# Patient Record
Sex: FEMALE | Race: AMERICAN INDIAN OR ALASKA NATIVE
[De-identification: names, ages, dates, MRNs, and addresses within clinical notes are randomized per-mention and may not be internally consistent; named-entity substitution may affect disease eponyms.]

---

## 2018-02-12 ENCOUNTER — HOSPITAL ENCOUNTER (EMERGENCY)
Dept: HOSPITAL 5 - ED | Age: 23
LOS: 1 days | Discharge: LEFT BEFORE DISCHARGE | End: 2018-02-13
Payer: SELF-PAY

## 2018-02-12 DIAGNOSIS — Z53.21: ICD-10-CM

## 2018-02-12 DIAGNOSIS — R06.02: Primary | ICD-10-CM

## 2018-02-12 DIAGNOSIS — R10.9: ICD-10-CM

## 2018-02-12 PROCEDURE — 83690 ASSAY OF LIPASE: CPT

## 2018-02-12 PROCEDURE — 36415 COLL VENOUS BLD VENIPUNCTURE: CPT

## 2018-02-12 PROCEDURE — 80053 COMPREHEN METABOLIC PANEL: CPT

## 2018-02-12 PROCEDURE — 93005 ELECTROCARDIOGRAM TRACING: CPT

## 2018-02-12 PROCEDURE — 93010 ELECTROCARDIOGRAM REPORT: CPT

## 2018-02-12 PROCEDURE — 81025 URINE PREGNANCY TEST: CPT

## 2018-02-12 PROCEDURE — 85025 COMPLETE CBC W/AUTO DIFF WBC: CPT

## 2018-02-12 PROCEDURE — 81001 URINALYSIS AUTO W/SCOPE: CPT

## 2018-02-13 VITALS — SYSTOLIC BLOOD PRESSURE: 118 MMHG | DIASTOLIC BLOOD PRESSURE: 65 MMHG

## 2018-02-13 LAB
ALBUMIN SERPL-MCNC: 4.2 G/DL (ref 3.9–5)
ALT SERPL-CCNC: 9 UNITS/L (ref 7–56)
BASOPHILS # (AUTO): 0 K/MM3 (ref 0–0.1)
BASOPHILS NFR BLD AUTO: 0.8 % (ref 0–1.8)
BILIRUB UR QL STRIP: (no result)
BLOOD UR QL VISUAL: (no result)
BUN SERPL-MCNC: 9 MG/DL (ref 7–17)
BUN/CREAT SERPL: 18 %
CALCIUM SERPL-MCNC: 8.9 MG/DL (ref 8.4–10.2)
EOSINOPHIL # BLD AUTO: 0.1 K/MM3 (ref 0–0.4)
EOSINOPHIL NFR BLD AUTO: 1.1 % (ref 0–4.3)
HCT VFR BLD CALC: 40.8 % (ref 30.3–42.9)
HEMOLYSIS INDEX: 14
HGB BLD-MCNC: 13.9 GM/DL (ref 10.1–14.3)
LIPASE SERPL-CCNC: 26 UNITS/L (ref 13–60)
LYMPHOCYTES # BLD AUTO: 2.2 K/MM3 (ref 1.2–5.4)
LYMPHOCYTES NFR BLD AUTO: 40.8 % (ref 13.4–35)
MCH RBC QN AUTO: 31 PG (ref 28–32)
MCHC RBC AUTO-ENTMCNC: 34 % (ref 30–34)
MCV RBC AUTO: 92 FL (ref 79–97)
MONOCYTES # (AUTO): 0.4 K/MM3 (ref 0–0.8)
MONOCYTES % (AUTO): 7.2 % (ref 0–7.3)
NITRITE UR QL STRIP: (no result)
PH UR STRIP: 6 [PH] (ref 5–7)
PLATELET # BLD: 155 K/MM3 (ref 140–440)
PROT UR STRIP-MCNC: (no result) MG/DL
RBC # BLD AUTO: 4.45 M/MM3 (ref 3.65–5.03)
RBC #/AREA URNS HPF: 1 /HPF (ref 0–6)
UROBILINOGEN UR-MCNC: < 2 MG/DL (ref ?–2)
WBC #/AREA URNS HPF: 1 /HPF (ref 0–6)

## 2018-02-16 ENCOUNTER — HOSPITAL ENCOUNTER (OUTPATIENT)
Dept: HOSPITAL 5 - ED | Age: 23
Setting detail: OBSERVATION
LOS: 2 days | Discharge: HOME | End: 2018-02-18
Attending: OBSTETRICS & GYNECOLOGY | Admitting: OBSTETRICS & GYNECOLOGY
Payer: SELF-PAY

## 2018-02-16 DIAGNOSIS — R10.31: ICD-10-CM

## 2018-02-16 DIAGNOSIS — N83.519: Primary | ICD-10-CM

## 2018-02-16 LAB
ALBUMIN SERPL-MCNC: 4.8 G/DL (ref 3.9–5)
ALT SERPL-CCNC: 10 UNITS/L (ref 7–56)
BACTERIA #/AREA URNS HPF: (no result) /HPF
BASOPHILS # (AUTO): 0 K/MM3 (ref 0–0.1)
BASOPHILS NFR BLD AUTO: 0.5 % (ref 0–1.8)
BILIRUB UR QL STRIP: (no result)
BLOOD UR QL VISUAL: (no result)
BUN SERPL-MCNC: 7 MG/DL (ref 7–17)
BUN/CREAT SERPL: 14 %
CALCIUM SERPL-MCNC: 9.8 MG/DL (ref 8.4–10.2)
EOSINOPHIL # BLD AUTO: 0 K/MM3 (ref 0–0.4)
EOSINOPHIL NFR BLD AUTO: 0.4 % (ref 0–4.3)
HCT VFR BLD CALC: 45.1 % (ref 30.3–42.9)
HEMOLYSIS INDEX: 22
HGB BLD-MCNC: 14.9 GM/DL (ref 10.1–14.3)
LYMPHOCYTES # BLD AUTO: 2.4 K/MM3 (ref 1.2–5.4)
LYMPHOCYTES NFR BLD AUTO: 36.2 % (ref 13.4–35)
MCH RBC QN AUTO: 31 PG (ref 28–32)
MCHC RBC AUTO-ENTMCNC: 33 % (ref 30–34)
MCV RBC AUTO: 92 FL (ref 79–97)
MONOCYTES # (AUTO): 0.3 K/MM3 (ref 0–0.8)
MONOCYTES % (AUTO): 5.1 % (ref 0–7.3)
NITRITE UR QL STRIP: (no result)
PH UR STRIP: 6 [PH] (ref 5–7)
PLATELET # BLD: 181 K/MM3 (ref 140–440)
PROT UR STRIP-MCNC: (no result) MG/DL
RBC # BLD AUTO: 4.89 M/MM3 (ref 3.65–5.03)
RBC #/AREA URNS HPF: 1 /HPF (ref 0–6)
UROBILINOGEN UR-MCNC: < 2 MG/DL (ref ?–2)
WBC #/AREA URNS HPF: 1 /HPF (ref 0–6)

## 2018-02-16 PROCEDURE — 99285 EMERGENCY DEPT VISIT HI MDM: CPT

## 2018-02-16 PROCEDURE — 85025 COMPLETE CBC W/AUTO DIFF WBC: CPT

## 2018-02-16 PROCEDURE — 93975 VASCULAR STUDY: CPT

## 2018-02-16 PROCEDURE — 80053 COMPREHEN METABOLIC PANEL: CPT

## 2018-02-16 PROCEDURE — G0378 HOSPITAL OBSERVATION PER HR: HCPCS

## 2018-02-16 PROCEDURE — 96376 TX/PRO/DX INJ SAME DRUG ADON: CPT

## 2018-02-16 PROCEDURE — 74019 RADEX ABDOMEN 2 VIEWS: CPT

## 2018-02-16 PROCEDURE — 84703 CHORIONIC GONADOTROPIN ASSAY: CPT

## 2018-02-16 PROCEDURE — 96374 THER/PROPH/DIAG INJ IV PUSH: CPT

## 2018-02-16 PROCEDURE — 76830 TRANSVAGINAL US NON-OB: CPT

## 2018-02-16 PROCEDURE — 96375 TX/PRO/DX INJ NEW DRUG ADDON: CPT

## 2018-02-16 PROCEDURE — 81001 URINALYSIS AUTO W/SCOPE: CPT

## 2018-02-16 PROCEDURE — 36415 COLL VENOUS BLD VENIPUNCTURE: CPT

## 2018-02-17 RX ADMIN — SODIUM CHLORIDE, SODIUM LACTATE, POTASSIUM CHLORIDE, AND CALCIUM CHLORIDE SCH MLS/HR: .6; .31; .03; .02 INJECTION, SOLUTION INTRAVENOUS at 06:10

## 2018-02-17 RX ADMIN — KETOROLAC TROMETHAMINE PRN MG: 30 INJECTION, SOLUTION INTRAMUSCULAR at 12:22

## 2018-02-17 RX ADMIN — KETOROLAC TROMETHAMINE PRN MG: 30 INJECTION, SOLUTION INTRAMUSCULAR at 06:10

## 2018-02-17 RX ADMIN — Medication SCH: at 09:45

## 2018-02-17 RX ADMIN — HYDROCODONE BITARTRATE AND ACETAMINOPHEN PRN EACH: 5; 325 TABLET ORAL at 14:47

## 2018-02-17 RX ADMIN — KETOROLAC TROMETHAMINE PRN MG: 30 INJECTION, SOLUTION INTRAMUSCULAR at 18:33

## 2018-02-17 NOTE — ULTRASOUND REPORT
FINAL REPORT



EXAM:  US PELVIS DUPLEX DOPPLER COMP



HISTORY:  pelvic pain 



TECHNIQUE:  Transabdominal imaging was obtained of the pelvis

with Doppler interrogation of the adnexa.



FINDINGS:  

The uterus is anteverted measuring 5.9 cm x 3.2 cm x 4.8 cm. The

myometrium is homogeneous. The endometrial thickness is 6.7

millimeters and appears normal. Free fluid is not seen.



The left ovary is normal in size contour and echotexture

measuring 2.9 cm x 1.7 cm x 2.2 cm. The Doppler waveforms appear

normal.



The right ovary appears enlarged measuring 2.3 cm x 2.3 cm x 4.3

cm. There are high Doppler waveforms in the right ovary. Partial

torsion is suspected.



IMPRESSION:  

Enlarged right ovary with elevated waveforms suspicious for

partial ovarian torsion.



Normal-appearing left ovary. No evidence of free fluid.

## 2018-02-17 NOTE — SHORT STAY SUMMARY
Short Stay Documentation


Date of service: 02/17/18


Narrative H&P: 





Pt is a 23-year-old  female G0 LMP 1/3/18 presents to the 

emergency department with complaint of some pain in the right side of the lower 

abdomen and/or pelvis with some radiation down her leg that has been going on 

for about 5 days.  She denies any nausea, vomiting, dysuria, vaginal bleeding 

or discharge, fever, constipation, diarrhea.  No known aggravating or 

alleviating factors.  She has taken some Tylenol for her symptoms without any 

relief.  She denies any past medical history other than sickle cell trait.  She 

does not have a primary care physician at this time as she says she recently 

moved to the area.  No sick contacts at home. Pelvic u/s showed an enlarged 

right ovary with elevated waveforms suspicious for partial ovarian torsion. She 

will therefore be admitted for Observation and conservative management.








- History


Principal diagnosis: Suspect ovarian torsion


H&P: obtained from office


Past Medical History: No medical history


Past Surgical History: No surgical history


Social history: no significant social history, single





- Allergies and Medications


Current Medications: 


 Allergies





No Known Allergies Allergy (Verified 02/13/18 00:19)


 





 Home Medications











 Medication  Instructions  Recorded  Confirmed  Last Taken  Type


 


No Known Home Medications [No  02/16/18 02/16/18 Unknown History





Reported Home Medications]     














- Physical exam


General appearance: mild distress


Integumentary: no rash


HEENT: Atraumatic


Lungs: Clear to auscultation


Breasts: deferred


Heart: Regular rate


Gastrointestinal: normal


Female Genitourinary: deferred


Extremities: no ischemia


Neurological: Normal speech





- Hospital course


Hospital course: 





Pt was admitted for Observation and pain management, and her symptoms improved.

  She is tolerating a reg diet without nausea or vomiting, ambulating and 

voiding without difficulty.  She will therefore be discharged to home with PO 

[place] for pain control and follow up with me in my office in 2-3 days.





- Disposition


Condition at discharge: Good


Disposition: DC-01 TO HOME OR SELFCARE





- Discharge Diagnoses


(1) Abdominal pain


Status: Resolved   


Qualifiers: 


   Abdominal location: right lower quadrant   Qualified Code(s): R10.31 - Right 

lower quadrant pain   





(2) Ovarian torsion


Status: Resolved   





Short Stay Discharge Plan


Activity: no restrictions


Diet: regular


Follow up with: 


PRIMARY CARE,MD [Primary Care Provider] - 3-5 Days


RAQUEL MALDONADO MD [Staff Physician] - 3 Days


Prescriptions: 


HYDROcodone/APAP 5-325 [Norco 5-325 mg TAB] 1 each PO Q6H PRN #30 tablet


 PRN Reason: Pain, Moderate (4-6)

## 2018-02-17 NOTE — ULTRASOUND REPORT
FINAL REPORT



EXAM:  US TRANSVAGINAL



HISTORY:  pelvic pain 



TECHNIQUE:  Transvaginal imaging was obtained of the pelvis with

Doppler interrogation of the adnexa.



FINDINGS:  

The uterus is anteverted measuring 5.9 cm x 3.2 cm x 4.8 cm. The

myometrium is homogeneous. The endometrium is 6.7 millimeters and

appears normal. Free fluid is not seen.



The left ovary is normal size contour echotexture and blood flow

containing benign follicles. It measures 2.9 cm x 1.7 cm x 2.2

cm.



The right ovary is mildly enlarged measuring 2.3 cm x 2.3 cm x

4.3 cm. There are benign follicles in the right ovary. There are

elevated waveforms in the right ovary. Partial torsion is

suspected.



IMPRESSION:  

Enlarged right ovary with elevated waveforms suspicious for

partial ovarian torsion.

## 2018-02-17 NOTE — XRAY REPORT
FINAL REPORT



EXAM:  XR ABDOMEN 2V



HISTORY:  Abd pain 



TECHNIQUE:  Two views of the abdomen and pelvis were submitted.



FINDINGS:  

The bowel gas pattern is normal. There is no evidence of mass

effect or suspicious calcifications. The lung bases are clear.

The skeletal structures appear normal.



IMPRESSION:  

Normal exam

## 2018-02-17 NOTE — EMERGENCY DEPARTMENT REPORT
HPI





- General


Chief Complaint: Abdominal Pain


Time Seen by Provider: 02/17/18 00:57





- HPI


HPI: 


23-year-old  female presents to the emergency department with 

complaint of some pain in the right side of the lower abdomen and/or pelvis 

with some radiation down her leg that has been going on for about 5 days.  She 

denies any nausea, vomiting, dysuria, vaginal bleeding or discharge, fever, 

constipation, diarrhea.  No known aggravating or alleviating factors.  She has 

taken some Tylenol for her symptoms without any relief.  She denies any past 

medical history other than sickle cell trait.  She does not have a primary care 

physician at this time as she says she recently moved to the area.  No sick 

contacts at home.








ED Past Medical Hx





- Past Medical History


Hx Sickle Cell Disease: No (sickle cell trait)


Additional medical history: sickle cell trait. Scoliosis





- Surgical History


Past Surgical History?: No





- Social History


Smoking Status: Never Smoker


Substance Use Type: None





- Medications


Home Medications: 


 Home Medications











 Medication  Instructions  Recorded  Confirmed  Last Taken  Type


 


No Known Home Medications [No  02/16/18 02/16/18 Unknown History





Reported Home Medications]     














ED Review of Systems


ROS: 


Stated complaint: ABD PAIN


Other details as noted in HPI





Comment: All other systems reviewed and negative


Constitutional: denies: chills, fever


Eyes: denies: eye pain, eye discharge, vision change


ENT: denies: ear pain, throat pain


Respiratory: denies: cough, shortness of breath, wheezing


Cardiovascular: denies: chest pain, palpitations


Gastrointestinal: abdominal pain.  denies: vomiting


Genitourinary: denies: urgency, dysuria, discharge


Musculoskeletal: denies: joint swelling, arthralgia


Skin: denies: rash, lesions


Neurological: denies: headache, weakness, paresthesias





Physical Exam





- Physical Exam


Vital Signs: 


 Vital Signs











  02/16/18





  21:05


 


Temperature 98.6 F


 


Pulse Rate 88


 


Respiratory 16





Rate 


 


Blood Pressure 110/50


 


O2 Sat by Pulse 99





Oximetry 











Physical Exam: 





GENERAL: The patient is well-developed well-nourished.


HENT: Normocephalic.  Atraumatic.    Patient has moist mucous membranes.


EYES: Extraocular motions are intact.  Pupils equal reactive to light 

bilaterally.


NECK: Supple.  Trachea is midline.


CHEST/LUNGS: Clear to auscultation.  There is no respiratory distress noted.


HEART/CARDIOVASCULAR: Regular.  There is no tachycardia.  There is no murmur.


ABDOMEN: Abdomen is soft.  Unable to reproduce lower abdominal and/or pelvic 

pain to palpation.  Patient has normal bowel sounds.  There is no abdominal 

distention.


SKIN: Skin is warm and dry.


NEURO: The patient is awake, alert, and oriented.  The patient is cooperative.  

The patient has no focal neurologic deficits.  The patient has normal speech.


MUSCULOSKELETAL: There is no tenderness or deformity.  There is no limitation 

range of motion.  There is no evidence of acute injury.





ED Course


 Vital Signs











  02/16/18





  21:05


 


Temperature 98.6 F


 


Pulse Rate 88


 


Respiratory 16





Rate 


 


Blood Pressure 110/50


 


O2 Sat by Pulse 99





Oximetry 














ED Medical Decision Making





- Lab Data


Result diagrams: 


 02/16/18 21:24





 02/16/18 21:24





- Radiology Data


Radiology results: report reviewed, image reviewed


interpreted by me: 





Abdominal x-ray shows nonspecific nonobstructive bowel gas





EXAM: US OB lt; = 14 WEEKS FETUS, US OB TRANSVAGINAL. 





HISTORY: Vaginal bleeding, pregnancy. 





TECHNIQUE: Directed transabdominal and transvaginal ultrasound 


examination of the pelvis was performed. No prior exams from this 


pregnancy are available for comparison. 





FINDINGS: 


The patient reports her last menstrual period 12/14/2017, 


corresponding to current gestational age of 9 weeks, 2 days. 





The uterus is anteverted, and measures 7.7 x 7.1 x 11.9 cm. There 


is a single intrauterine gestation, with visualization of the 


gestational sac, yolk sac, and fetal pole. The crown-rump length 


measures 2.7 cm, corresponding to gestational age of 9 weeks, 3 


days (estimated due date 09/19/2018). Fetal cardiac activity is 


identified, with a heart rate of 171 beats per minute. There is a 


1.5 x 0.5 cm amorphous region of hypoechogenicity adjacent to the 


anterior aspect of the gestational sac, probably small 


subchorionic hemorrhage. 





The right ovary measures 2.5 x 2.4 x 2.9 cm, and contains an 


irregularly marginated hypoechoic lesion measuring 2.0 x 1.2 cm, 


probably hemorrhagic cyst or corpus luteum. The left ovary is not 


visualized on the transabdominal or transvaginal images. There is 


appropriate color doppler flow within the right ovary. No other 


adnexal mass is seen. 





There is no significant pelvic free fluid. 





IMPRESSION: 


1. Single live intrauterine gestation with gestational age of 9 


weeks, 3 days, as estimated by crown-rump length (RUBI 


09/19/2018). 





2. Small focus of probable subchorionic hemorrhage anteriorly, 


and follow-up routine imaging is recommended. 





3. Nonvisualization of the left ovary. 2.0 x 1.2 cm hemorrhagic 


cyst or corpus luteum in the right ovary. 











Transcribed By: MIGUEL 


Dictated By: HOLLI JUNG MD 


Electronically Authenticated By: HOLLI JUNG MD 


Signed Date/Time: 02/16/18 9974 








- Medical Decision Making


Patient has 5-6 day history of right-sided pelvic pain.  Labs have been mostly 

unremarkable.  Abdominal x-ray does not show any acute process.  The 

transvaginal/pelvic Doppler ultrasound shows concern for a partial right-sided 

ovarian torsion.  The OB/GYN on-call, Dr. Ferris, was contacted and it was 

decided that the patient would be admitted for observation and he has 

graciously accepted the patient to his service.








- Differential Diagnosis


ovarian torsion, ovarian cyst, UTI, pregnancy, fibroid


Critical Care Time: No


Critical care attestation.: 


If time is entered above; I have spent that time in minutes in the direct care 

of this critically ill patient, excluding procedure time.








ED Disposition


Clinical Impression: 


 Ovarian torsion





Abdominal pain


Qualifiers:


 Abdominal location: right lower quadrant Qualified Code(s): R10.31 - Right 

lower quadrant pain





Disposition: DC-09 OP ADMIT IP TO THIS HOSP


Is pt being admited?: Yes


Condition: Stable


Instructions:  Abdominal Pain (ED)


Referrals: 


PRIMARY CARE,MD [Primary Care Provider] - 3-5 Days


Time of Disposition: 02:38

## 2018-02-18 VITALS — SYSTOLIC BLOOD PRESSURE: 96 MMHG | DIASTOLIC BLOOD PRESSURE: 61 MMHG

## 2018-02-18 RX ADMIN — SODIUM CHLORIDE, SODIUM LACTATE, POTASSIUM CHLORIDE, AND CALCIUM CHLORIDE SCH MLS/HR: .6; .31; .03; .02 INJECTION, SOLUTION INTRAVENOUS at 17:50

## 2018-02-18 RX ADMIN — Medication SCH: at 10:00

## 2018-02-18 RX ADMIN — HYDROCODONE BITARTRATE AND ACETAMINOPHEN PRN EACH: 5; 325 TABLET ORAL at 12:00

## 2018-02-18 RX ADMIN — KETOROLAC TROMETHAMINE PRN MG: 30 INJECTION, SOLUTION INTRAMUSCULAR at 00:57

## 2018-02-18 RX ADMIN — KETOROLAC TROMETHAMINE PRN MG: 30 INJECTION, SOLUTION INTRAMUSCULAR at 08:50

## 2018-02-21 ENCOUNTER — HOSPITAL ENCOUNTER (EMERGENCY)
Dept: HOSPITAL 5 - ED | Age: 23
Discharge: HOME | End: 2018-02-21
Payer: SELF-PAY

## 2018-02-21 VITALS — SYSTOLIC BLOOD PRESSURE: 99 MMHG | DIASTOLIC BLOOD PRESSURE: 56 MMHG

## 2018-02-21 DIAGNOSIS — N39.0: Primary | ICD-10-CM

## 2018-02-21 DIAGNOSIS — Z87.891: ICD-10-CM

## 2018-02-21 LAB
ALBUMIN SERPL-MCNC: 4.6 G/DL (ref 3.9–5)
ALT SERPL-CCNC: 9 UNITS/L (ref 7–56)
BASOPHILS # (AUTO): 0 K/MM3 (ref 0–0.1)
BASOPHILS NFR BLD AUTO: 0.5 % (ref 0–1.8)
BILIRUB UR QL STRIP: (no result)
BLOOD UR QL VISUAL: (no result)
BUN SERPL-MCNC: 9 MG/DL (ref 7–17)
BUN/CREAT SERPL: 15 %
CALCIUM SERPL-MCNC: 9.4 MG/DL (ref 8.4–10.2)
EOSINOPHIL # BLD AUTO: 0 K/MM3 (ref 0–0.4)
EOSINOPHIL NFR BLD AUTO: 0.7 % (ref 0–4.3)
HCT VFR BLD CALC: 41.4 % (ref 30.3–42.9)
HEMOLYSIS INDEX: 12
HGB BLD-MCNC: 13.7 GM/DL (ref 10.1–14.3)
LYMPHOCYTES # BLD AUTO: 1.4 K/MM3 (ref 1.2–5.4)
LYMPHOCYTES NFR BLD AUTO: 28.7 % (ref 13.4–35)
MCH RBC QN AUTO: 30 PG (ref 28–32)
MCHC RBC AUTO-ENTMCNC: 33 % (ref 30–34)
MCV RBC AUTO: 92 FL (ref 79–97)
MONOCYTES # (AUTO): 0.3 K/MM3 (ref 0–0.8)
MONOCYTES % (AUTO): 6.9 % (ref 0–7.3)
MUCOUS THREADS #/AREA URNS HPF: (no result) /HPF
PH UR STRIP: 5 [PH] (ref 5–7)
PLATELET # BLD: 193 K/MM3 (ref 140–440)
RBC # BLD AUTO: 4.5 M/MM3 (ref 3.65–5.03)
RBC #/AREA URNS HPF: > 182 /HPF (ref 0–6)
UROBILINOGEN UR-MCNC: < 2 MG/DL (ref ?–2)
WBC #/AREA URNS HPF: > 182 /HPF (ref 0–6)

## 2018-02-21 PROCEDURE — 93005 ELECTROCARDIOGRAM TRACING: CPT

## 2018-02-21 PROCEDURE — 93975 VASCULAR STUDY: CPT

## 2018-02-21 PROCEDURE — 80053 COMPREHEN METABOLIC PANEL: CPT

## 2018-02-21 PROCEDURE — 99284 EMERGENCY DEPT VISIT MOD MDM: CPT

## 2018-02-21 PROCEDURE — 81001 URINALYSIS AUTO W/SCOPE: CPT

## 2018-02-21 PROCEDURE — 76830 TRANSVAGINAL US NON-OB: CPT

## 2018-02-21 PROCEDURE — 36415 COLL VENOUS BLD VENIPUNCTURE: CPT

## 2018-02-21 PROCEDURE — 93010 ELECTROCARDIOGRAM REPORT: CPT

## 2018-02-21 PROCEDURE — 81025 URINE PREGNANCY TEST: CPT

## 2018-02-21 PROCEDURE — 96374 THER/PROPH/DIAG INJ IV PUSH: CPT

## 2018-02-21 PROCEDURE — 96375 TX/PRO/DX INJ NEW DRUG ADDON: CPT

## 2018-02-21 PROCEDURE — 85025 COMPLETE CBC W/AUTO DIFF WBC: CPT

## 2018-02-21 PROCEDURE — 96361 HYDRATE IV INFUSION ADD-ON: CPT

## 2018-02-21 NOTE — EMERGENCY DEPARTMENT REPORT
ED Abdominal Pain HPI





- General


Chief Complaint: Abdominal Pain


Stated Complaint: ABD, C/P, VAGINAL BLEEDING


Time Seen by Provider: 18 11:54


Source: patient


Mode of arrival: Ambulatory


Limitations: No Limitations





- History of Present Illness


Initial Comments: 





Patient is 23 years old female recently discharged from the hospital for 

possible partial ovarian torsion.  And was discharged on pain medication.  

Patient retained the ER stating that her pain is getting more suspicion is 

crying tears in the ER.  Described her pain as right lower quadrant that 

radiated to her right leg similar to what she had and she was admitted a few 

days ago.  Patient denied fever.


MD Complaint: abdominal pain


-: days(s)


Location: RLQ


Migration to: no migration


Severity scale (0 -10): 9


Quality: stabbing


Associated Symptoms: nausea





- Related Data


 Previous Rx's











 Medication  Instructions  Recorded  Last Taken  Type


 


HYDROcodone/APAP 5-325 [Norco 1 each PO Q6H PRN #30 tablet 18 Unknown Rx





5-325 mg TAB]    











 Allergies











Allergy/AdvReac Type Severity Reaction Status Date / Time


 


No Known Allergies Allergy   Verified 18 00:19














ED Review of Systems


ROS: 


Stated complaint: ABD, C/P, VAGINAL BLEEDING


Other details as noted in HPI





Comment: All other systems reviewed and negative


Constitutional: denies: chills, fever


Respiratory: denies: cough, orthopnea, shortness of breath, SOB with exertion


Cardiovascular: denies: chest pain, palpitations, dyspnea on exertion


Gastrointestinal: abdominal pain, nausea.  denies: vomiting, diarrhea, 

constipation, hematemesis, melena, hematochezia


Genitourinary: denies: urgency, dysuria, frequency, hematuria


Musculoskeletal: denies: back pain, joint swelling


Neurological: denies: headache, weakness, numbness, paresthesias





ED Past Medical Hx





- Past Medical History


Previous Medical History?: Yes


Hx Congestive Heart Failure: No


Hx Diabetes: No (paternal aunt diabetes)


Hx Sickle Cell Disease: No (sickle cell trait)


Hx Asthma: No


Additional medical history: scoliosis.  partial ovarian torsion (admitted week 

of )





- Surgical History


Additional Surgical History: partial ovarian torsion (admitted week of )





- Social History


Smoking Status: Former Smoker





- Medications


Home Medications: 


 Home Medications











 Medication  Instructions  Recorded  Confirmed  Last Taken  Type


 


HYDROcodone/APAP 5-325 [Norco 1 each PO Q6H PRN #30 tablet 18 

Unknown Rx





5-325 mg TAB]     














ED Physical Exam





- General


Limitations: No Limitations


General appearance: alert, in no apparent distress, other (patient crying in 

tears)





- Head


Head exam: Present: atraumatic, normocephalic, normal inspection





- Eye


Eye exam: Present: normal appearance, PERRL





- ENT


ENT exam: Present: normal exam, normal orophraynx, mucous membranes moist





- Neck


Neck exam: Present: normal inspection, full ROM.  Absent: tenderness, 

meningismus, lymphadenopathy, thyromegaly





- Respiratory


Respiratory exam: Present: normal lung sounds bilaterally.  Absent: respiratory 

distress, wheezes, rales, rhonchi, stridor, chest wall tenderness, accessory 

muscle use, decreased breath sounds, prolonged expiratory





- Cardiovascular


Cardiovascular Exam: Present: regular rate, normal rhythm, normal heart sounds





- GI/Abdominal


GI/Abdominal exam: Present: soft, tenderness (right lower quadrant tenderness, 

no rebound tenderness or guarding), normal bowel sounds.  Absent: guarding, 

rebound, rigid, organomegaly, mass, bruit, pulsatile mass, hernia





- Extremities Exam


Extremities exam: Present: normal inspection, full ROM, normal capillary refill





- Back Exam


Back exam: Present: normal inspection, full ROM.  Absent: CVA tenderness (L)





- Neurological Exam


Neurological exam: Present: alert, oriented X3, CN II-XII intact, normal gait, 

reflexes normal





- Skin


Skin exam: Present: warm, intact, normal color





ED Course


 Vital Signs











  18





  11:30


 


Temperature 97.7 F


 


Pulse Rate 102 H


 


Respiratory 18





Rate 


 


Blood Pressure 112/68


 


O2 Sat by Pulse 98





Oximetry 














ED Medical Decision Making





- Lab Data


Result diagrams: 


 18 12:11





 18 12:11





- Radiology Data


Radiology results: report reviewed





Referring Physician:   VICENTE ROBERTSON


Patient Name:   BRANDON BASS


Patient ID:   [id number]


YOB: 1995


Sex:   Female


Accession:   [id number]


Report Date:   2018


Report Status:   Finalized


Findings


[facility] 


[address] [place] [address] 





Ultrasound Report 


Signed 





Patient: BRANDON BASS MR#: [id number] 


: 1995 Acct:[id number] 


Age/Sex: 23 / F ADM Date: 18 


Loc: ED 


Attending Dr: 








Ordering Physician: VICENTE ROBERTSON 


Date of Service: 18 


Procedure(s): US pelvis duplex doppler comp 


Accession Number(s): [id number] 





cc: VICENTE ROBERTSON 








ULTRASOUND PELVIS DUPLEX DOPPLER COMPLETE - TRANSABDOMINAL AND 


TRANSVAGINAL: 





INDICATION: Pelvic pain. 





COMPARISON: 2018. 





FINDINGS: Transabdominal and transvaginal pelvic sonography with 


spectral Doppler performed in this patient with LMP of 2018 and 


demonstrates a homogenous, anteverted 6.3 x 2.6 x 3.1 cm uterus with 


endometrial thickness of 0.2 cm, endovaginal image 8. No significant 


free fluid. 





Right ovary now estimated at 3.3 x 1.7 x 3.1 cm while the left ovary is 


3.3 x 1.8 x 1.5 cm. Preserved bilateral ovarian blood flow with 


average peak systolic velocities of approximately 10 cm/s. 





CONCLUSION: Physiologic pelvic sonogram, as described. Please 


correlate. 





Thank you for the opportunity to participate in this patient's care. 





Transcribed By: RS 


Dictated By: MYRA RAYO MD 


Electronically Authenticated By: MYRA RAYO MD 


Signed Date/Time: 18 1306 











DD/DT: 18 1259 


TD/TT: 18 1306





- Medical Decision Making





Discussed with Dr. [place] Ferris, I informed him about the patient and the 

current ultrasound finding.  Dr. Ferris stated that patient can be discharged 

and follow-up with him in his office.


Critical care attestation.: 


If time is entered above; I have spent that time in minutes in the direct care 

of this critically ill patient, excluding procedure time.








ED Disposition


Clinical Impression: 


 Abdominal pain, UTI (urinary tract infection)





Disposition: -01 TO HOME OR SELFCARE


Is pt being admited?: No


Condition: Stable


Instructions:  Abdominal Pain (ED), Urinary Tract Infection in Women (ED)


Referrals: 


WEST FERRIS MD [Staff Physician] - 3-5 Days

## 2018-02-21 NOTE — ULTRASOUND REPORT
ULTRASOUND PELVIS DUPLEX DOPPLER COMPLETE - TRANSABDOMINAL AND 

TRANSVAGINAL:



INDICATION: Pelvic pain.



COMPARISON: 2/17/2018.



FINDINGS: Transabdominal and transvaginal pelvic sonography with 

spectral Doppler performed in this patient with LMP of 2/18/2018 and 

demonstrates a homogenous, anteverted 6.3 x 2.6 x 3.1 cm uterus with 

endometrial thickness of 0.2 cm, endovaginal image 8.  No significant 

free fluid.



Right ovary now estimated at 3.3 x 1.7 x 3.1 cm while the left ovary is 

3.3 x 1.8 x 1.5 cm.  Preserved bilateral ovarian blood flow with 

average peak systolic velocities of approximately 10 cm/s.



CONCLUSION: Physiologic pelvic sonogram, as described.  Please 

correlate.



Thank you for the opportunity to participate in this patient's care.

## 2018-03-13 ENCOUNTER — HOSPITAL ENCOUNTER (EMERGENCY)
Dept: HOSPITAL 5 - ED | Age: 23
Discharge: HOME | End: 2018-03-13
Payer: SELF-PAY

## 2018-03-13 VITALS — SYSTOLIC BLOOD PRESSURE: 107 MMHG | DIASTOLIC BLOOD PRESSURE: 77 MMHG

## 2018-03-13 DIAGNOSIS — R10.9: Primary | ICD-10-CM

## 2018-03-13 DIAGNOSIS — N89.8: ICD-10-CM

## 2018-03-13 LAB
BAND NEUTROPHILS # (MANUAL): 0 K/MM3
BILIRUB UR QL STRIP: (no result)
BLOOD UR QL VISUAL: (no result)
BUN SERPL-MCNC: 5 MG/DL (ref 7–17)
BUN/CREAT SERPL: 8 %
CALCIUM SERPL-MCNC: 8.7 MG/DL (ref 8.4–10.2)
HCT VFR BLD CALC: 39.3 % (ref 30.3–42.9)
HEMOLYSIS INDEX: 4
HGB BLD-MCNC: 13.1 GM/DL (ref 10.1–14.3)
MCH RBC QN AUTO: 30 PG (ref 28–32)
MCHC RBC AUTO-ENTMCNC: 33 % (ref 30–34)
MCV RBC AUTO: 91 FL (ref 79–97)
MUCOUS THREADS #/AREA URNS HPF: (no result) /HPF
MYELOCYTES # (MANUAL): 0 K/MM3
PH UR STRIP: 5 [PH] (ref 5–7)
PLATELET # BLD: 160 K/MM3 (ref 140–440)
PROMYELOCYTES # (MANUAL): 0 K/MM3
PROT UR STRIP-MCNC: (no result) MG/DL
RBC # BLD AUTO: 4.32 M/MM3 (ref 3.65–5.03)
RBC #/AREA URNS HPF: 4 /HPF (ref 0–6)
TOTAL CELLS COUNTED BLD: 100
UROBILINOGEN UR-MCNC: < 2 MG/DL (ref ?–2)
WBC #/AREA URNS HPF: 5 /HPF (ref 0–6)

## 2018-03-13 PROCEDURE — 81025 URINE PREGNANCY TEST: CPT

## 2018-03-13 PROCEDURE — 96372 THER/PROPH/DIAG INJ SC/IM: CPT

## 2018-03-13 PROCEDURE — 99283 EMERGENCY DEPT VISIT LOW MDM: CPT

## 2018-03-13 PROCEDURE — 81001 URINALYSIS AUTO W/SCOPE: CPT

## 2018-03-13 PROCEDURE — 36415 COLL VENOUS BLD VENIPUNCTURE: CPT

## 2018-03-13 PROCEDURE — 87591 N.GONORRHOEAE DNA AMP PROB: CPT

## 2018-03-13 PROCEDURE — 80048 BASIC METABOLIC PNL TOTAL CA: CPT

## 2018-03-13 PROCEDURE — 85007 BL SMEAR W/DIFF WBC COUNT: CPT

## 2018-03-13 PROCEDURE — 85025 COMPLETE CBC W/AUTO DIFF WBC: CPT

## 2018-03-13 NOTE — EMERGENCY DEPARTMENT REPORT
ED Abdominal Pain HPI





- General


Chief Complaint: Abdominal Pain


Stated Complaint: ABD PAIN; VAG D/C


Time Seen by Provider: 03/13/18 08:06


Source: patient


Mode of arrival: Ambulatory


Limitations: No Limitations





- History of Present Illness


Initial Comments: 





Patient is 23 years old female recently diagnosed with possible ovarian 

torsion.  Patient was seen after that twice in the ER and Dr. Ferris her 

gynecologist and ultrasound repeated and did not show any evidence of torsion.  

Patient stated that her symptoms went away and came back again.  Patient stated 

that she had unprotected sex last week.  She is having vaginal discharge also.  

She denied any fever, nausea or vomiting or diarrhea.


MD Complaint: abdominal pain





- Related Data


 Previous Rx's











 Medication  Instructions  Recorded  Last Taken  Type


 


HYDROcodone/APAP 5-325 [Norco 1 each PO Q6H PRN #30 tablet 02/18/18 Unknown Rx





5-325 mg TAB]    


 


Fluconazole [Diflucan TAB] 100 mg PO BID 2 Days  tablet 02/21/18 Unknown Rx


 


Ketorolac [Toradol] 10 mg PO Q6H PRN #20 tablet 02/21/18 Unknown Rx


 


Sulfamethoxazole/Trimethoprim 1 each PO BID #14 tablet 02/21/18 Unknown Rx





[Bactrim DS TAB]    











 Allergies











Allergy/AdvReac Type Severity Reaction Status Date / Time


 


No Known Allergies Allergy   Verified 02/13/18 00:19














ED Review of Systems


ROS: 


Stated complaint: ABD PAIN; VAG D/C


Other details as noted in HPI





Comment: All other systems reviewed and negative


Constitutional: denies: chills, fever


Respiratory: denies: cough, orthopnea, shortness of breath, SOB with exertion


Cardiovascular: denies: chest pain, palpitations


Gastrointestinal: abdominal pain.  denies: nausea, vomiting, diarrhea


Neurological: denies: headache, weakness, numbness, paresthesias





ED Past Medical Hx





- Past Medical History


Hx Congestive Heart Failure: No


Hx Diabetes:  (paternal aunt diabetes)


Hx Sickle Cell Disease:  (sickle cell trait)


Hx Asthma: No


Additional medical history: scoliosis.  partial ovarian torsion (admitted week 

of Feb. 19)





- Surgical History


Additional Surgical History: partial ovarian torsion (admitted week of Feb. 19)





- Social History


Smoking Status: Never Smoker


Substance Use Type: None





- Medications


Home Medications: 


 Home Medications











 Medication  Instructions  Recorded  Confirmed  Last Taken  Type


 


HYDROcodone/APAP 5-325 [Norco 1 each PO Q6H PRN #30 tablet 02/18/18 02/21/18 

Unknown Rx





5-325 mg TAB]     


 


Fluconazole [Diflucan TAB] 100 mg PO BID 2 Days  tablet 02/21/18  Unknown Rx


 


Ketorolac [Toradol] 10 mg PO Q6H PRN #20 tablet 02/21/18  Unknown Rx


 


Sulfamethoxazole/Trimethoprim 1 each PO BID #14 tablet 02/21/18  Unknown Rx





[Bactrim DS TAB]     














ED Physical Exam





- General


Limitations: No Limitations


General appearance: alert, in no apparent distress, other (patient laying in 

bed in no acute distress playing video games on her iPhone.)





- Head


Head exam: Present: atraumatic, normocephalic, normal inspection





- Eye


Eye exam: Present: normal appearance, PERRL





- ENT


ENT exam: Present: normal exam, normal orophraynx, mucous membranes moist





- Neck


Neck exam: Present: normal inspection, full ROM.  Absent: tenderness, 

meningismus, lymphadenopathy





- Respiratory


Respiratory exam: Present: normal lung sounds bilaterally.  Absent: respiratory 

distress, wheezes, rales, rhonchi, stridor, chest wall tenderness, accessory 

muscle use, decreased breath sounds, prolonged expiratory





- Cardiovascular


Cardiovascular Exam: Present: regular rate, normal rhythm, normal heart sounds





- GI/Abdominal


GI/Abdominal exam: Present: soft, normal bowel sounds.  Absent: distended, 

tenderness, guarding, rebound, rigid, diminished bowel sounds, organomegaly, 

mass, bruit, pulsatile mass, hernia





- Extremities Exam


Extremities exam: Present: normal inspection, full ROM, normal capillary refill





- Back Exam


Back exam: Present: normal inspection, full ROM.  Absent: CVA tenderness (L)





- Neurological Exam


Neurological exam: Present: alert, oriented X3, CN II-XII intact





- Skin


Skin exam: Present: warm, intact, normal color





ED Course


 Vital Signs











  03/13/18 03/13/18 03/13/18





  02:24 08:36 08:45


 


Temperature 97.8 F  


 


Pulse Rate 57 L  


 


Respiratory 16  





Rate   


 


Blood Pressure 112/68  100/64


 


Blood Pressure   





[Left]   


 


O2 Sat by Pulse 100 98 97





Oximetry   














  03/13/18 03/13/18 03/13/18





  09:00 09:16 09:30


 


Temperature  98.6 F 


 


Pulse Rate  64 


 


Respiratory  18 





Rate   


 


Blood Pressure 100/63 105/55 108/67


 


Blood Pressure  100/63 





[Left]   


 


O2 Sat by Pulse 100 96 92





Oximetry   














  03/13/18 03/13/18 03/13/18





  09:45 10:00 10:16


 


Temperature   


 


Pulse Rate   


 


Respiratory   





Rate   


 


Blood Pressure 108/68 97/67 102/74


 


Blood Pressure   





[Left]   


 


O2 Sat by Pulse 97 97 99





Oximetry   














  03/13/18





  10:30


 


Temperature 


 


Pulse Rate 


 


Respiratory 





Rate 


 


Blood Pressure 107/77


 


Blood Pressure 





[Left] 


 


O2 Sat by Pulse 100





Oximetry 














ED Medical Decision Making





- Lab Data


Result diagrams: 


 03/13/18 02:45





 03/13/18 02:45





- Medical Decision Making





GC and chlamydia test is still pending.  I will treat patient empirically with 

Rocephin 250 mg IM and zithromax 1 g.


Critical care attestation.: 


If time is entered above; I have spent that time in minutes in the direct care 

of this critically ill patient, excluding procedure time.








ED Disposition


Clinical Impression: 


 Abdominal pain, Possible exposure to STD





Disposition: DC-01 TO HOME OR SELFCARE


Is pt being admited?: No


Condition: Stable


Instructions:  Abdominal Pain (ED), Sexually Transmitted Diseases (ED)

## 2018-03-21 ENCOUNTER — HOSPITAL ENCOUNTER (EMERGENCY)
Dept: HOSPITAL 5 - ED | Age: 23
Discharge: HOME | End: 2018-03-21
Payer: SELF-PAY

## 2018-03-21 VITALS — DIASTOLIC BLOOD PRESSURE: 56 MMHG | SYSTOLIC BLOOD PRESSURE: 97 MMHG

## 2018-03-21 DIAGNOSIS — F17.200: ICD-10-CM

## 2018-03-21 DIAGNOSIS — R10.31: Primary | ICD-10-CM

## 2018-03-21 DIAGNOSIS — F31.9: ICD-10-CM

## 2018-03-21 LAB
ALBUMIN SERPL-MCNC: 4.3 G/DL (ref 3.9–5)
ALT SERPL-CCNC: 12 UNITS/L (ref 7–56)
BASOPHILS # (AUTO): 0 K/MM3 (ref 0–0.1)
BASOPHILS NFR BLD AUTO: 1.1 % (ref 0–1.8)
BILIRUB UR QL STRIP: (no result)
BLOOD UR QL VISUAL: (no result)
BUN SERPL-MCNC: 10 MG/DL (ref 7–17)
BUN/CREAT SERPL: 17 %
CALCIUM SERPL-MCNC: 9 MG/DL (ref 8.4–10.2)
EOSINOPHIL # BLD AUTO: 0 K/MM3 (ref 0–0.4)
EOSINOPHIL NFR BLD AUTO: 0.9 % (ref 0–4.3)
HCT VFR BLD CALC: 41.8 % (ref 30.3–42.9)
HEMOLYSIS INDEX: 3
HGB BLD-MCNC: 13.9 GM/DL (ref 10.1–14.3)
LYMPHOCYTES # BLD AUTO: 2.1 K/MM3 (ref 1.2–5.4)
LYMPHOCYTES NFR BLD AUTO: 45.3 % (ref 13.4–35)
MCH RBC QN AUTO: 30 PG (ref 28–32)
MCHC RBC AUTO-ENTMCNC: 33 % (ref 30–34)
MCV RBC AUTO: 91 FL (ref 79–97)
MONOCYTES # (AUTO): 0.3 K/MM3 (ref 0–0.8)
MONOCYTES % (AUTO): 5.6 % (ref 0–7.3)
MUCOUS THREADS #/AREA URNS HPF: (no result) /HPF
PH UR STRIP: 7 [PH] (ref 5–7)
PLATELET # BLD: 185 K/MM3 (ref 140–440)
PROT UR STRIP-MCNC: (no result) MG/DL
RBC # BLD AUTO: 4.58 M/MM3 (ref 3.65–5.03)
RBC #/AREA URNS HPF: 0 /HPF (ref 0–6)
UROBILINOGEN UR-MCNC: 4 MG/DL (ref ?–2)
WBC #/AREA URNS HPF: 1 /HPF (ref 0–6)

## 2018-03-21 PROCEDURE — 80053 COMPREHEN METABOLIC PANEL: CPT

## 2018-03-21 PROCEDURE — 81001 URINALYSIS AUTO W/SCOPE: CPT

## 2018-03-21 PROCEDURE — 84703 CHORIONIC GONADOTROPIN ASSAY: CPT

## 2018-03-21 PROCEDURE — 99283 EMERGENCY DEPT VISIT LOW MDM: CPT

## 2018-03-21 PROCEDURE — 85025 COMPLETE CBC W/AUTO DIFF WBC: CPT

## 2018-03-21 PROCEDURE — 36415 COLL VENOUS BLD VENIPUNCTURE: CPT

## 2018-03-21 NOTE — EMERGENCY DEPARTMENT REPORT
ED Abdominal Pain HPI





- General


Chief Complaint: Abdominal Pain


Stated Complaint: ABD PAIN; N/V


Time Seen by Provider: 03/21/18 22:08


Source: patient


Mode of arrival: Ambulatory


Limitations: No Limitations





- History of Present Illness


Initial Comments: 


Ms Dorman is 23 years old female, is familiar to me, I saw her for the last 

3 time in the ER for the same complaint.  Patient is complaining of right lower 

quadrant pain, patient initially admitted for possible ovarian torsion which 

she was completely ruled out by Dr. Ferris.  Patient noted that she talked to 

Dr. Ferris and I asked her to come to the ER.  Patient stated that the patient 

is a sand is no difference from the last time.  Patient denied any fever, 

vaginal discharge or vaginal bleeding.


MD Complaint: abdominal pain


-: week(s)


Location: RLQ


Radiation: none


Severity: moderate


Severity scale (0 -10): 4


Quality: sharp


Associated Symptoms: denies other symptoms, nausea, vomiting.  denies: diarrhea

, fever, chills, dysuria, hematemesis, hematochezia, melena, hematuria, anorexia





- Related Data


 Previous Rx's











 Medication  Instructions  Recorded  Last Taken  Type


 


HYDROcodone/APAP 5-325 [Norco 1 each PO Q6H PRN #30 tablet 02/18/18 Unknown Rx





5-325 mg TAB]    


 


Fluconazole [Diflucan TAB] 100 mg PO BID 2 Days  tablet 02/21/18 Unknown Rx


 


Ketorolac [Toradol] 10 mg PO Q6H PRN #20 tablet 02/21/18 Unknown Rx


 


Sulfamethoxazole/Trimethoprim 1 each PO BID #14 tablet 02/21/18 Unknown Rx





[Bactrim DS TAB]    


 


Naproxen [Naprosyn] 500 mg PO BID #14 tablet 03/13/18 Unknown Rx











 Allergies











Allergy/AdvReac Type Severity Reaction Status Date / Time


 


No Known Allergies Allergy   Verified 02/13/18 00:19














ED Review of Systems


ROS: 


Stated complaint: ABD PAIN; N/V


Other details as noted in HPI





Comment: All other systems reviewed and negative


Constitutional: denies: chills, fever


Respiratory: denies: cough, orthopnea


Gastrointestinal: abdominal pain, nausea.  denies: vomiting, diarrhea, 

constipation, hematemesis, melena, hematochezia


Neurological: denies: headache, weakness, numbness, paresthesias, confusion





ED Past Medical Hx





- Past Medical History


Previous Medical History?: Yes


Hx Congestive Heart Failure: No


Hx Diabetes:  (paternal aunt diabetes)


Hx Sickle Cell Disease: Yes (sickle cell trait)


Hx Psychiatric Treatment: Yes (bipolar, depression)


Hx Asthma: No


Additional medical history: scoliosis.  partial ovarian torsion (admitted week 

of Feb. 19)





- Surgical History


Past Surgical History?: Yes


Additional Surgical History: partial ovarian torsion (admitted week of Feb. 19)





- Social History


Smoking Status: Current Every Day Smoker


Substance Use Type: Alcohol, Prescribed





- Medications


Home Medications: 


 Home Medications











 Medication  Instructions  Recorded  Confirmed  Last Taken  Type


 


HYDROcodone/APAP 5-325 [Norco 1 each PO Q6H PRN #30 tablet 02/18/18 02/21/18 

Unknown Rx





5-325 mg TAB]     


 


Fluconazole [Diflucan TAB] 100 mg PO BID 2 Days  tablet 02/21/18  Unknown Rx


 


Ketorolac [Toradol] 10 mg PO Q6H PRN #20 tablet 02/21/18  Unknown Rx


 


Sulfamethoxazole/Trimethoprim 1 each PO BID #14 tablet 02/21/18  Unknown Rx





[Bactrim DS TAB]     


 


Naproxen [Naprosyn] 500 mg PO BID #14 tablet 03/13/18  Unknown Rx














ED Physical Exam





- General


Limitations: No Limitations


General appearance: alert, in no apparent distress





- Head


Head exam: Present: atraumatic, normocephalic, normal inspection





- Eye


Eye exam: Present: normal appearance, PERRL





- ENT


ENT exam: Present: normal exam, normal orophraynx, mucous membranes moist





- Neck


Neck exam: Present: normal inspection, full ROM.  Absent: tenderness, 

meningismus, lymphadenopathy, thyromegaly





- Respiratory


Respiratory exam: Present: normal lung sounds bilaterally.  Absent: respiratory 

distress, wheezes, rales, rhonchi, stridor, chest wall tenderness, accessory 

muscle use, decreased breath sounds, prolonged expiratory





- Cardiovascular


Cardiovascular Exam: Present: regular rate, normal rhythm, normal heart sounds





- GI/Abdominal


GI/Abdominal exam: Present: soft, normal bowel sounds.  Absent: distended, 

tenderness, guarding, rebound, rigid, diminished bowel sounds, organomegaly, 

mass, bruit, pulsatile mass, hernia





- Extremities Exam


Extremities exam: Present: normal inspection, full ROM, normal capillary refill





- Back Exam


Back exam: Present: normal inspection, full ROM.  Absent: tenderness, CVA 

tenderness (R), CVA tenderness (L)





- Neurological Exam


Neurological exam: Present: alert, oriented X3, CN II-XII intact, normal gait





- Skin


Skin exam: Present: warm, intact, normal color





ED Course





 Vital Signs











  03/21/18 03/21/18





  20:13 20:19


 


Temperature 97.8 F 97.8 F


 


Pulse Rate 76 76


 


Respiratory 16 16





Rate  


 


Blood Pressure 97/56 97/56


 


O2 Sat by Pulse 100 100





Oximetry  














ED Medical Decision Making





- Lab Data


Result diagrams: 


 03/21/18 20:45





 03/21/18 20:45





- Medical Decision Making





Patient is having the same symptoms that she had last time she was fully worked 

up for that nothing acute was found patient today in no acute distress her 

abdomen is completely soft and benign was no tenderness or guarding no bleeding 

or discharge.  Patient has an appointment next Friday was Dr. Ferris I advised 

patient to keep her appointment as he will need outpatient workup not in the ER.


Critical care attestation.: 


If time is entered above; I have spent that time in minutes in the direct care 

of this critically ill patient, excluding procedure time.








ED Disposition


Clinical Impression: 


 Abdominal pain





Disposition: DC-01 TO HOME OR SELFCARE


Is pt being admited?: No


Condition: Stable


Instructions:  Abdominal Pain (ED)

## 2019-06-25 ENCOUNTER — HOSPITAL ENCOUNTER (EMERGENCY)
Dept: HOSPITAL 5 - ED | Age: 24
Discharge: HOME | End: 2019-06-25
Payer: SELF-PAY

## 2019-06-25 VITALS — SYSTOLIC BLOOD PRESSURE: 100 MMHG | DIASTOLIC BLOOD PRESSURE: 65 MMHG

## 2019-06-25 DIAGNOSIS — F31.9: ICD-10-CM

## 2019-06-25 DIAGNOSIS — F12.10: ICD-10-CM

## 2019-06-25 DIAGNOSIS — N30.00: Primary | ICD-10-CM

## 2019-06-25 DIAGNOSIS — Z79.899: ICD-10-CM

## 2019-06-25 LAB
BACTERIA #/AREA URNS HPF: (no result) /HPF
BILIRUB UR QL STRIP: (no result)
BLOOD UR QL VISUAL: (no result)
MUCOUS THREADS #/AREA URNS HPF: (no result) /HPF
PH UR STRIP: 7 [PH] (ref 5–7)
PROT UR STRIP-MCNC: (no result) MG/DL
RBC #/AREA URNS HPF: 2 /HPF (ref 0–6)
UROBILINOGEN UR-MCNC: < 2 MG/DL (ref ?–2)
WBC #/AREA URNS HPF: 1 /HPF (ref 0–6)

## 2019-06-25 PROCEDURE — 81025 URINE PREGNANCY TEST: CPT

## 2019-06-25 PROCEDURE — 82962 GLUCOSE BLOOD TEST: CPT

## 2019-06-25 PROCEDURE — 99283 EMERGENCY DEPT VISIT LOW MDM: CPT

## 2019-06-25 PROCEDURE — 81001 URINALYSIS AUTO W/SCOPE: CPT

## 2019-06-25 NOTE — EMERGENCY DEPARTMENT REPORT
ED General Adult HPI





- General


Chief complaint: Nausea/Vomiting/Diarrhea


Stated complaint: ABDOMINAL/COUGH


Time Seen by Provider: 06/25/19 12:58


Source: patient


Mode of arrival: Ambulatory


Limitations: No Limitations





- History of Present Illness


Initial comments: 





Patient is a 24-year-old female who states she is presently one week late with 

her menses and she also has had some suprapubic discomfort.  Patient states pain

is crampy in nature and 7 out of 10 in severity.  She has some pressure like 

sensation when she urinates.  Patient been nauseous but not vomiting.  She 

denies a fever chills or diarrhea, cold or congestion at this time.





- Related Data


                                  Previous Rx's











 Medication  Instructions  Recorded  Last Taken  Type


 


HYDROcodone/APAP 5-325 [Norco 1 each PO Q6H PRN #30 tablet 02/18/18 Unknown Rx





5-325 mg TAB]    


 


Fluconazole [Diflucan TAB] 100 mg PO BID 2 Days  tablet 02/21/18 Unknown Rx


 


Ketorolac [Toradol] 10 mg PO Q6H PRN #20 tablet 02/21/18 Unknown Rx


 


Sulfamethoxazole/Trimethoprim 1 each PO BID #14 tablet 02/21/18 Unknown Rx





[Bactrim DS TAB]    


 


Naproxen [Naprosyn] 500 mg PO BID #14 tablet 03/13/18 Unknown Rx


 


Naproxen [Naprosyn] 500 mg PO BID #14 tablet 03/21/18 Unknown Rx


 


Ondansetron [Zofran Odt] 4 mg PO Q8HR PRN #14 tab.rapdis 03/21/18 Unknown Rx


 


Nitrofurantoin Mono/M-Cryst 100 mg PO Q12HR #14 capsule 06/25/19 Unknown Rx





[Macrobid CAP]    


 


Ondansetron [Zofran Odt] 4 mg PO Q8HR #10 tab.rapdis 06/25/19 Unknown Rx











                                    Allergies











Allergy/AdvReac Type Severity Reaction Status Date / Time


 


No Known Allergies Allergy   Verified 02/13/18 00:19














ED Review of Systems


ROS: 


Stated complaint: ABDOMINAL/COUGH


Other details as noted in HPI





Comment: All other systems reviewed and negative





ED Past Medical Hx





- Past Medical History


Previous Medical History?: Yes


Hx Congestive Heart Failure: No


Hx Diabetes:  (paternal aunt diabetes)


Hx Sickle Cell Disease: Yes (sickle cell trait)


Hx Psychiatric Treatment: Yes (bipolar, depression)


Hx Asthma: No


Additional medical history: scoliosis.  partial ovarian torsion (admitted week 

of Feb. 19)





- Surgical History


Past Surgical History?: Yes


Additional Surgical History: partial ovarian torsion (admitted week of Feb. 19)





- Social History


Smoking Status: Never Smoker


Substance Use Type: Alcohol, Marijuana





- Medications


Home Medications: 


                                Home Medications











 Medication  Instructions  Recorded  Confirmed  Last Taken  Type


 


HYDROcodone/APAP 5-325 [Norco 1 each PO Q6H PRN #30 tablet 02/18/18 02/21/18 

Unknown Rx





5-325 mg TAB]     


 


Fluconazole [Diflucan TAB] 100 mg PO BID 2 Days  tablet 02/21/18  Unknown Rx


 


Ketorolac [Toradol] 10 mg PO Q6H PRN #20 tablet 02/21/18  Unknown Rx


 


Sulfamethoxazole/Trimethoprim 1 each PO BID #14 tablet 02/21/18  Unknown Rx





[Bactrim DS TAB]     


 


Naproxen [Naprosyn] 500 mg PO BID #14 tablet 03/13/18  Unknown Rx


 


Naproxen [Naprosyn] 500 mg PO BID #14 tablet 03/21/18  Unknown Rx


 


Ondansetron [Zofran Odt] 4 mg PO Q8HR PRN #14 tab.rapdis 03/21/18  Unknown Rx


 


Nitrofurantoin Mono/M-Cryst 100 mg PO Q12HR #14 capsule 06/25/19  Unknown Rx





[Macrobid CAP]     


 


Ondansetron [Zofran Odt] 4 mg PO Q8HR #10 tab.rapdis 06/25/19  Unknown Rx














ED Physical Exam





- General


Limitations: No Limitations


General appearance: alert, in no apparent distress





- Head


Head exam: Present: atraumatic, normocephalic





- Eye


Eye exam: Present: normal appearance





- ENT


ENT exam: Present: mucous membranes moist





- Neck


Neck exam: Present: normal inspection





- Respiratory


Respiratory exam: Present: normal lung sounds bilaterally.  Absent: respiratory 

distress, wheezes, rales





- Cardiovascular


Cardiovascular Exam: Present: regular rate, normal rhythm.  Absent: systolic 

murmur, diastolic murmur, rubs, gallop





- GI/Abdominal


GI/Abdominal exam: Present: soft, tenderness (mild suprapubic pain), normal 

bowel sounds.  Absent: distended, guarding, rebound





- Extremities Exam


Extremities exam: Present: normal inspection





- Back Exam


Back exam: Present: normal inspection





- Neurological Exam


Neurological exam: Present: alert, oriented X3





- Psychiatric


Psychiatric exam: Present: normal affect, normal mood





- Skin


Skin exam: Present: warm, dry, intact, normal color.  Absent: rash





ED Course





                                   Vital Signs











  06/25/19





  10:14


 


Temperature 97.9 F


 


Pulse Rate 92 H


 


Respiratory 16





Rate 


 


Blood Pressure 100/65


 


O2 Sat by Pulse 96





Oximetry 














ED Medical Decision Making





- Lab Data








                                   Lab Results











  06/25/19 Range/Units





  11:05 


 


Urine Color  Yellow  (Yellow)  


 


Urine Turbidity  Slightly-cloudy  (Clear)  


 


Urine pH  7.0  (5.0-7.0)  


 


Ur Specific Gravity  1.011  (1.003-1.030)  


 


Urine Protein  <15 mg/dl  (Negative)  mg/dL


 


Urine Glucose (UA)  150  (Negative)  mg/dL


 


Urine Ketones  Neg  (Negative)  mg/dL


 


Urine Blood  Mod  (Negative)  


 


Urine Nitrite  Neg  (Negative)  


 


Urine Bilirubin  Neg  (Negative)  


 


Urine Urobilinogen  < 2.0  (<2.0)  mg/dL


 


Ur Leukocyte Esterase  Mod  (Negative)  


 


Urine WBC (Auto)  1.0  (0.0-6.0)  /HPF


 


Urine RBC (Auto)  2.0  (0.0-6.0)  /HPF


 


U Epithel Cells (Auto)  11.0  (0-13.0)  /HPF


 


Urine Bacteria (Auto)  1+  (Negative)  /HPF


 


Urine Mucus  Few  /HPF


 


Urine Yeast (Budding)  Few  /HPF


 


Urine HCG, Qual  Negative  (Negative)  














- Medical Decision Making





Patient was late with her menses did not take a pregnancy test at home.  Because

of her pain there was a concern for ectopic pregnancy.





Preg test was performed here and was negative.  Patient does show evidence of a 

mild urinary tract infection.  Patient be started on Macrobid and discharged 

home.


Critical care attestation.: 


If time is entered above; I have spent that time in minutes in the direct care 

of this critically ill patient, excluding procedure time.








ED Disposition


Clinical Impression: 


Acute cystitis


Qualifiers:


 Hematuria presence: without hematuria Qualified Code(s): N30.00 - Acute 

cystitis without hematuria





Disposition: DC-01 TO HOME OR SELFCARE


Is pt being admited?: No


Does the pt Need Aspirin: No


Condition: Stable


Instructions:  Urinary Tract Infection in Women (ED)


Referrals: 


[facility] MD [Primary Care Provider] - 3-5 Days


ZENAIDA CHRIS MD [Staff Physician] - 3-5 Days


Time of Disposition: 14:03

## 2019-07-13 ENCOUNTER — HOSPITAL ENCOUNTER (EMERGENCY)
Dept: HOSPITAL 5 - ED | Age: 24
Discharge: HOME | End: 2019-07-13
Payer: SELF-PAY

## 2019-07-13 VITALS — DIASTOLIC BLOOD PRESSURE: 68 MMHG | SYSTOLIC BLOOD PRESSURE: 116 MMHG

## 2019-07-13 DIAGNOSIS — F17.200: ICD-10-CM

## 2019-07-13 DIAGNOSIS — L73.9: Primary | ICD-10-CM

## 2019-07-13 DIAGNOSIS — Z79.899: ICD-10-CM

## 2019-07-13 DIAGNOSIS — F31.9: ICD-10-CM

## 2019-07-13 PROCEDURE — 99282 EMERGENCY DEPT VISIT SF MDM: CPT

## 2019-07-13 NOTE — EMERGENCY DEPARTMENT REPORT
ED Rash HPI





- HPI


Chief Complaint: Skin/Abscess/Foreign Body


Stated Complaint: LT ARM PAIN


Time Seen by Provider: 07/13/19 15:37


Duration: 3 Days


Location: Upper Extremities


Suspected Cause: Unknown


Rash Symptoms: No Itching, No Facial Swelling, No Tongue/Oral Swelling, No 

Breathing Difficulties, No Choking Sensation, No Wheezing/Dyspnea, No Peeling, 

No Blistering, No Fever, No Lightheaded, No Malaise, No Myalgias


Severity: moderate


Other History: "bumps under L arm".  no other symptoms





ED Review of Systems


ROS: 


Stated complaint: LT ARM PAIN


Other details as noted in HPI





Comment: All other systems reviewed and negative


Skin: as per HPI





ED Past Medical Hx





- Past Medical History


Previous Medical History?: Yes


Hx Congestive Heart Failure: No


Hx Diabetes:  (paternal aunt diabetes)


Hx Sickle Cell Disease: Yes (sickle cell trait)


Hx Psychiatric Treatment: Yes (bipolar, depression)


Hx Asthma: No


Additional medical history: scoliosis.  partial ovarian torsion (admitted week 

of Feb. 19)





- Surgical History


Past Surgical History?: Yes


Additional Surgical History: partial ovarian torsion (admitted week of Feb. 19)





- Social History


Smoking Status: Current Every Day Smoker


Substance Use Type: Alcohol





- Medications


Home Medications: 


                                Home Medications











 Medication  Instructions  Recorded  Confirmed  Last Taken  Type


 


HYDROcodone/APAP 5-325 [Norco 1 each PO Q6H PRN #30 tablet 02/18/18 02/21/18 

Unknown Rx





5-325 mg TAB]     


 


Fluconazole [Diflucan TAB] 100 mg PO BID 2 Days  tablet 02/21/18  Unknown Rx


 


Ketorolac [Toradol] 10 mg PO Q6H PRN #20 tablet 02/21/18  Unknown Rx


 


Naproxen [Naprosyn] 500 mg PO BID #14 tablet 03/21/18  Unknown Rx


 


Ondansetron [Zofran Odt] 4 mg PO Q8HR PRN #14 tab.rapdis 03/21/18  Unknown Rx


 


Nitrofurantoin Mono/M-Cryst 100 mg PO Q12HR #14 capsule 06/25/19  Unknown Rx





[Macrobid CAP]     


 


Ondansetron [Zofran Odt] 4 mg PO Q8HR #10 tab.rapdis 06/25/19  Unknown Rx


 


Naproxen [Naprosyn TAB] 500 mg PO BID #20 tablet 07/13/19  Unknown Rx


 


Sulfamethoxazole/Trimethoprim 1 each PO BID #20 tablet 07/13/19  Unknown Rx





[Bactrim DS TAB]     














Rash Exam





- Exam


General: 


Vital signs noted. No distress. Alert and acting appropriately.





HEENT: No Periorbital Edema, No Conjuctival Injection, No Perioral Edema, No 

Tongue Edema, No Uvular Edema, No Compromised Airway, No Drooling


Skin: Yes Other (folliculitis L axilla; no large abscess formation)


Other: Positive: Neurologic Normal, Musculoskeletal Normal





ED Course


                                   Vital Signs











  07/13/19





  14:53


 


Temperature 97.9 F


 


Pulse Rate 87


 


Respiratory 16





Rate 


 


Blood Pressure 105/64


 


O2 Sat by Pulse 98





Oximetry 














ED Medical Decision Making





- Medical Decision Making





folliculitis


supportive care/ fu pcp





- Differential Diagnosis


folliculitis, hsv, molluscum 


Critical care attestation.: 


If time is entered above; I have spent that time in minutes in the direct care 

of this critically ill patient, excluding procedure time.








ED Disposition


Clinical Impression: 


 Folliculitis





Disposition: DC-01 TO HOME OR SELFCARE


Is pt being admited?: No


Condition: Good


Instructions:  Folliculitis (ED)


Prescriptions: 


Sulfamethoxazole/Trimethoprim [Bactrim DS TAB] 1 each PO BID #20 tablet


Naproxen [Naprosyn TAB] 500 mg PO BID #20 tablet


Referrals: 


DAISY NATION MD [Staff Physician] - 3-5 Days


Time of Disposition: 15:40

## 2020-03-18 ENCOUNTER — HOSPITAL ENCOUNTER (EMERGENCY)
Dept: HOSPITAL 5 - ED | Age: 25
Discharge: LEFT BEFORE DISCHARGE | End: 2020-03-18
Payer: SELF-PAY

## 2020-03-18 VITALS — DIASTOLIC BLOOD PRESSURE: 67 MMHG | SYSTOLIC BLOOD PRESSURE: 112 MMHG

## 2020-03-18 DIAGNOSIS — R10.30: Primary | ICD-10-CM

## 2020-03-18 DIAGNOSIS — Z53.21: ICD-10-CM
